# Patient Record
Sex: FEMALE
[De-identification: names, ages, dates, MRNs, and addresses within clinical notes are randomized per-mention and may not be internally consistent; named-entity substitution may affect disease eponyms.]

---

## 2019-05-25 PROBLEM — Z00.00 ENCOUNTER FOR PREVENTIVE HEALTH EXAMINATION: Status: ACTIVE | Noted: 2019-05-25

## 2019-06-17 ENCOUNTER — NON-APPOINTMENT (OUTPATIENT)
Age: 74
End: 2019-06-17

## 2019-06-17 ENCOUNTER — APPOINTMENT (OUTPATIENT)
Dept: OPHTHALMOLOGY | Facility: CLINIC | Age: 74
End: 2019-06-17
Payer: MEDICARE

## 2019-06-17 PROCEDURE — 92133 CPTRZD OPH DX IMG PST SGM ON: CPT

## 2019-06-17 PROCEDURE — 92020 GONIOSCOPY: CPT

## 2019-06-17 PROCEDURE — 92004 COMPRE OPH EXAM NEW PT 1/>: CPT

## 2019-06-24 ENCOUNTER — APPOINTMENT (OUTPATIENT)
Dept: OPHTHALMOLOGY | Facility: CLINIC | Age: 74
End: 2019-06-24

## 2019-09-13 ENCOUNTER — APPOINTMENT (OUTPATIENT)
Dept: OPHTHALMOLOGY | Facility: CLINIC | Age: 74
End: 2019-09-13

## 2019-11-05 ENCOUNTER — APPOINTMENT (OUTPATIENT)
Dept: SURGICAL ONCOLOGY | Facility: CLINIC | Age: 74
End: 2019-11-05
Payer: MEDICARE

## 2019-11-05 VITALS
HEART RATE: 65 BPM | WEIGHT: 110 LBS | SYSTOLIC BLOOD PRESSURE: 132 MMHG | HEIGHT: 60 IN | BODY MASS INDEX: 21.6 KG/M2 | DIASTOLIC BLOOD PRESSURE: 84 MMHG | TEMPERATURE: 98 F

## 2019-11-05 DIAGNOSIS — Z86.19 PERSONAL HISTORY OF OTHER INFECTIOUS AND PARASITIC DISEASES: ICD-10-CM

## 2019-11-05 DIAGNOSIS — Z87.19 PERSONAL HISTORY OF OTHER DISEASES OF THE DIGESTIVE SYSTEM: ICD-10-CM

## 2019-11-05 DIAGNOSIS — Z86.59 PERSONAL HISTORY OF OTHER MENTAL AND BEHAVIORAL DISORDERS: ICD-10-CM

## 2019-11-05 DIAGNOSIS — C18.7 MALIGNANT NEOPLASM OF SIGMOID COLON: ICD-10-CM

## 2019-11-05 DIAGNOSIS — Z85.3 PERSONAL HISTORY OF MALIGNANT NEOPLASM OF BREAST: ICD-10-CM

## 2019-11-05 DIAGNOSIS — Z86.79 PERSONAL HISTORY OF OTHER DISEASES OF THE CIRCULATORY SYSTEM: ICD-10-CM

## 2019-11-05 PROCEDURE — 99204 OFFICE O/P NEW MOD 45 MIN: CPT

## 2019-11-05 RX ORDER — MULTIVIT-MIN/FA/LYCOPEN/LUTEIN .4-300-25
TABLET ORAL
Refills: 0 | Status: ACTIVE | COMMUNITY

## 2019-11-05 RX ORDER — METOPROLOL TARTRATE 25 MG/1
25 TABLET, FILM COATED ORAL
Refills: 0 | Status: ACTIVE | COMMUNITY

## 2019-11-05 RX ORDER — METRONIDAZOLE 500 MG/1
500 TABLET ORAL
Qty: 3 | Refills: 0 | Status: ACTIVE | COMMUNITY
Start: 2019-11-05 | End: 1900-01-01

## 2019-11-05 RX ORDER — SIMETHICONE 180 MG
CAPSULE ORAL
Refills: 0 | Status: ACTIVE | COMMUNITY

## 2019-11-05 RX ORDER — MULTIVITAMIN
TABLET ORAL
Refills: 0 | Status: ACTIVE | COMMUNITY

## 2019-11-05 RX ORDER — POLYETHYLENE GLYCOL 3350 AND ELECTROLYTES WITH LEMON FLAVOR 236; 22.74; 6.74; 5.86; 2.97 G/4L; G/4L; G/4L; G/4L; G/4L
236 POWDER, FOR SOLUTION ORAL
Qty: 1 | Refills: 0 | Status: ACTIVE | COMMUNITY
Start: 2019-11-05 | End: 1900-01-01

## 2019-11-05 RX ORDER — NEOMYCIN SULFATE 500 MG/1
500 TABLET ORAL
Qty: 6 | Refills: 0 | Status: ACTIVE | COMMUNITY
Start: 2019-11-05 | End: 1900-01-01

## 2019-11-05 RX ORDER — OMEPRAZOLE 40 MG/1
40 CAPSULE, DELAYED RELEASE ORAL
Refills: 0 | Status: ACTIVE | COMMUNITY

## 2019-11-05 RX ORDER — GLUCOSAM/CHOND/GLYCOSAMINOG/C 500-400 MG
CAPSULE ORAL
Refills: 0 | Status: ACTIVE | COMMUNITY

## 2019-11-05 RX ORDER — ESCITALOPRAM OXALATE 20 MG/1
20 TABLET, FILM COATED ORAL
Refills: 0 | Status: ACTIVE | COMMUNITY

## 2019-11-05 RX ORDER — CLONAZEPAM 0.5 MG/1
0.5 TABLET ORAL
Refills: 0 | Status: ACTIVE | COMMUNITY

## 2019-11-05 NOTE — HISTORY OF PRESENT ILLNESS
[de-identified] : Patient Name: STACY GREER \par MRN: [id number] \par [place] MRN: [id number]\par Referring Provider: none \par Oncologist: Natasha Urias MD\par Date: Nov 05, 2019 \par \par Diagnosis: pancreas adenocarcinoma and sigmoid colon mass\par \par She presents for evaluation of recently diagnosed pancreas cancer and sigmoid colon mass. She has a history of breast cancer in 1997 s/p lumpectomy and chemotherapy. She recently had symptoms of constipation and blood in stool and abdominal discomfort. Blood work revealed elevated LFT's (and 6 months ago they were normal even with her known history of hepatitis C). Also, she reports her CEA was high. She had a CT AP revealing a mass on the pancreas. She was referred for an upper EUS and colonoscopy which was done 10/31/19 and findings revealed pancreas adenocarcinoma and a mass in the sigmoid colon also adenoca.\par \par Curently, Ms. GREER has mild RLQ abdominal pain and discomfort, denies having decreased appetite, and denies nausea or vomiting. She denies changes in bowel habits and denies recent unintentional weight loss. She denies fevers, chills, or night sweats.\par \par Functional Status: Ms. GREER is able to walk 5 blocks without fatigue or dyspnea.(does have some limitations due to her knee) \par

## 2019-11-05 NOTE — REVIEW OF SYSTEMS
[Negative] : ENT [FreeTextEntry8] : As per HPI [de-identified] : +knee pain [de-identified] : +history of depression and anxiety

## 2019-11-05 NOTE — PHYSICAL EXAM
[Normal] : oriented to person, place and time, with appropriate affect [de-identified] : Anicteric [de-identified] : S1,S2, regular rate and rhythm. No murmurs heard. [de-identified] : Clear throughout. No wheezes heard. [de-identified] : no masses [de-identified] : warm and dry

## 2019-11-05 NOTE — ASSESSMENT
[FreeTextEntry1] : I) Pancreas and colon adenoca\par \par P) Long discussion w patient and family including daughter by phone. Has 2 foci of adenoca, one in tail of pancreas and one in descending colon. Whether one process by direct extension or via metastases unclear. Will send ca19-9 and complete staging workup w chest CT. Tentatively will plan for at least diagnostic laparoscopy next week. Will await rest of workup before deciding about whether to consider neoadjuvant treatment or move to surgery and attempt resection. Resection would unquestionably require a multivisceral resection. We discussed distal pancreatectomy, splenectomy, bowel resection. Risks and benefits discussed, including but not limited to post bleeding, infection, anastomotic leak, pancreatic fistula. All questions answered.Will be in touch after rest of workup completed.\par \par Espinoza Barkley MD\par \par Chief Surgical Oncology\par Multidisciplinary GI cancer program\par [facility] [place]\par [facility]\par \par Professor Surgery\par [employer]\par \par cc Dr Romero\par

## 2019-11-05 NOTE — REASON FOR VISIT
[Initial Consultation] : an initial consultation for [Family Member] : family member [FreeTextEntry2] : pancreas mass

## 2019-11-05 NOTE — RESULTS/DATA
[FreeTextEntry1] : Diagnostic Studies\par \par Date: 10/31/19\par Study: Upper EUS\par Results: A mass was identified in the pancreatic tail. FNA was performed. There was no sign of significant pathology in the pancreatic head, pancreatic body and pancreatic neck. Hiatal hernia, erythematous mucosa in the gastric body and antrum. normal examined duodenum. \par \par Date: 10/31/19\par Study: Colonoscopy\par Results: Likely malignant partially obstructing tumor in the sigmoid colon.\par \par Date: 10/29/19\par Study: CT AP\par Results: (1) 5.7cm pancreatic tail neoplasm. loss of fat plane with multiple small bowel loops in the left upper quadrant, the posterior wall of the gastric body, and the spleen. no lymphadenopathy or abdominopelvic metastases. No evidence of cirrhosis, portal hypertension, or hepatocellular carcinoma.\par \par Labs:\par Date: 11/4/19\par Results: TBili 0.5, Albumin 3.6, , , Alk Phos 64, GGTP 93, AFP 3.2\par \par Pathology:\par Date: 10/31/19\par Results: (1) Tail of pancreas mass, FNA - positive for malignancy, adenocarcinoma (2) colon mod differentiated adenoca similar morphologically similar to pancreas mass\par \par

## 2019-11-06 ENCOUNTER — LABORATORY RESULT (OUTPATIENT)
Age: 74
End: 2019-11-06

## 2019-11-07 ENCOUNTER — TRANSCRIPTION ENCOUNTER (OUTPATIENT)
Age: 74
End: 2019-11-07

## 2019-11-12 LAB
ABO + RH PNL BLD: NORMAL
ABO + RH PNL BLD: NORMAL
ALBUMIN SERPL ELPH-MCNC: 3.9 G/DL
ALP BLD-CCNC: 63 U/L
ALT SERPL-CCNC: 308 U/L
ANION GAP SERPL CALC-SCNC: 11 MMOL/L
APTT BLD: 29.7 SEC
AST SERPL-CCNC: 283 U/L
BASOPHILS # BLD AUTO: 0.02 K/UL
BASOPHILS NFR BLD AUTO: 0.5 %
BILIRUB DIRECT SERPL-MCNC: 0.2 MG/DL
BILIRUB INDIRECT SERPL-MCNC: 0.3 MG/DL
BILIRUB SERPL-MCNC: 0.6 MG/DL
BUN SERPL-MCNC: 24 MG/DL
CALCIUM SERPL-MCNC: 9.2 MG/DL
CHLORIDE SERPL-SCNC: 100 MMOL/L
CO2 SERPL-SCNC: 27 MMOL/L
CREAT SERPL-MCNC: 0.78 MG/DL
EOSINOPHIL # BLD AUTO: 0.03 K/UL
EOSINOPHIL NFR BLD AUTO: 0.7 %
GLUCOSE SERPL-MCNC: 106 MG/DL
HCT VFR BLD CALC: 36.2 %
HGB BLD-MCNC: 11.3 G/DL
IMM GRANULOCYTES NFR BLD AUTO: 0.2 %
INR PPP: 0.95 RATIO
LYMPHOCYTES # BLD AUTO: 1.32 K/UL
LYMPHOCYTES NFR BLD AUTO: 32.7 %
MAN DIFF?: NORMAL
MCHC RBC-ENTMCNC: 30.5 PG
MCHC RBC-ENTMCNC: 31.2 GM/DL
MCV RBC AUTO: 97.6 FL
MONOCYTES # BLD AUTO: 0.6 K/UL
MONOCYTES NFR BLD AUTO: 14.9 %
NEUTROPHILS # BLD AUTO: 2.06 K/UL
NEUTROPHILS NFR BLD AUTO: 51 %
PLATELET # BLD AUTO: 296 K/UL
POTASSIUM SERPL-SCNC: 4.2 MMOL/L
PROT SERPL-MCNC: 7 G/DL
PT BLD: 10.9 SEC
RBC # BLD: 3.71 M/UL
RBC # FLD: 14.2 %
SODIUM SERPL-SCNC: 138 MMOL/L
WBC # FLD AUTO: 4.04 K/UL

## 2019-11-13 ENCOUNTER — RESULT REVIEW (OUTPATIENT)
Age: 74
End: 2019-11-13

## 2019-11-13 ENCOUNTER — APPOINTMENT (OUTPATIENT)
Dept: SURGICAL ONCOLOGY | Facility: HOSPITAL | Age: 74
End: 2019-11-13

## 2019-11-13 ENCOUNTER — OUTPATIENT (OUTPATIENT)
Dept: OUTPATIENT SERVICES | Facility: HOSPITAL | Age: 74
LOS: 1 days | Discharge: HOME | End: 2019-11-13
Payer: MEDICARE

## 2019-11-13 DIAGNOSIS — Z90.12 ACQUIRED ABSENCE OF LEFT BREAST AND NIPPLE: Chronic | ICD-10-CM

## 2019-11-13 PROCEDURE — 49321 LAPAROSCOPY BIOPSY: CPT

## 2019-11-18 PROBLEM — C18.9 MALIGNANT NEOPLASM OF COLON, UNSPECIFIED: Chronic | Status: ACTIVE | Noted: 2019-11-12

## 2019-11-18 PROBLEM — C50.919 MALIGNANT NEOPLASM OF UNSPECIFIED SITE OF UNSPECIFIED FEMALE BREAST: Chronic | Status: ACTIVE | Noted: 2019-11-12

## 2019-11-18 PROBLEM — K86.89 OTHER SPECIFIED DISEASES OF PANCREAS: Chronic | Status: ACTIVE | Noted: 2019-11-12

## 2019-11-18 PROBLEM — K21.9 GASTRO-ESOPHAGEAL REFLUX DISEASE WITHOUT ESOPHAGITIS: Chronic | Status: ACTIVE | Noted: 2019-11-12

## 2019-11-18 PROBLEM — F32.9 MAJOR DEPRESSIVE DISORDER, SINGLE EPISODE, UNSPECIFIED: Chronic | Status: ACTIVE | Noted: 2019-11-12

## 2019-11-18 PROBLEM — B19.20 UNSPECIFIED VIRAL HEPATITIS C WITHOUT HEPATIC COMA: Chronic | Status: ACTIVE | Noted: 2019-11-12

## 2019-11-18 PROBLEM — I10 ESSENTIAL (PRIMARY) HYPERTENSION: Chronic | Status: ACTIVE | Noted: 2019-11-12

## 2019-11-19 ENCOUNTER — APPOINTMENT (OUTPATIENT)
Dept: SURGICAL ONCOLOGY | Facility: CLINIC | Age: 74
End: 2019-11-19
Payer: MEDICARE

## 2019-11-19 VITALS
TEMPERATURE: 98.9 F | HEART RATE: 73 BPM | DIASTOLIC BLOOD PRESSURE: 80 MMHG | HEIGHT: 60 IN | SYSTOLIC BLOOD PRESSURE: 141 MMHG | WEIGHT: 127 LBS | BODY MASS INDEX: 24.94 KG/M2

## 2019-11-19 DIAGNOSIS — K86.89 OTHER SPECIFIED DISEASES OF PANCREAS: ICD-10-CM

## 2019-11-19 PROCEDURE — 99024 POSTOP FOLLOW-UP VISIT: CPT

## 2019-11-19 NOTE — HISTORY OF PRESENT ILLNESS
[FreeTextEntry1] : Patient Name: STACY GREER \par MRN: [id number] \par [place] MRN: [id number] \par Referring Provider: none \par Oncologist: Dr. Bernie Walker\par Date: Nov 19, 2019 \par \par Diagnosis: pancreas adenocarcinoma and sigmoid colon mass\par Operative Date: 11/13/19\par Procedure: diagnostic laparoscopy\par \par  6 days s/p diagnostic lap.  \par \par Curently, Ms. GREER has some right sided abdominal pain and discomfort. She denies fevers, chills, or night sweats. She is tolerating a regular diet and is having regular bowel movements. Pain is well controlled with prn tylenol. She has complaints of pubic area swelling.\par \par Final Pathology Showed - metastatic adenocarcinoma\par \par

## 2019-11-19 NOTE — ASSESSMENT
[FreeTextEntry1] : I) normal postop\par \par P) Will be referred to medical oncology. No role for surgery.\par \par Espinoza Barkley MD\par \par Chief Surgical Oncology\par Multidisciplinary GI cancer program\par [facility] [place]\par [facility]\par \par Professor Surgery\par [employer]\par

## 2019-11-19 NOTE — PHYSICAL EXAM
[Normal] : well developed, well nourished, in no acute distress [de-identified] : Soft, non tender, surgical sites are clean and dry without signs of infection

## 2019-11-19 NOTE — DATA REVIEWED
[FreeTextEntry1] : Diagnostic Studies: No new results to review\par \par Labs: No new results to review\par \par Pathology: *see advanced result citation for complete report\par \par

## 2020-06-19 ENCOUNTER — APPOINTMENT (OUTPATIENT)
Dept: OPHTHALMOLOGY | Facility: CLINIC | Age: 75
End: 2020-06-19

## 2023-10-01 PROBLEM — K86.89 MASS OF PANCREAS: Status: ACTIVE | Noted: 2019-11-05
